# Patient Record
Sex: MALE | Race: WHITE | NOT HISPANIC OR LATINO | Employment: OTHER | URBAN - METROPOLITAN AREA
[De-identification: names, ages, dates, MRNs, and addresses within clinical notes are randomized per-mention and may not be internally consistent; named-entity substitution may affect disease eponyms.]

---

## 2018-01-11 ENCOUNTER — INITIAL CONSULT (OUTPATIENT)
Dept: DERMATOLOGY | Facility: CLINIC | Age: 68
End: 2018-01-11
Payer: MEDICARE

## 2018-01-11 VITALS
SYSTOLIC BLOOD PRESSURE: 147 MMHG | WEIGHT: 175 LBS | HEART RATE: 76 BPM | DIASTOLIC BLOOD PRESSURE: 82 MMHG | HEIGHT: 67 IN | BODY MASS INDEX: 27.47 KG/M2

## 2018-01-11 DIAGNOSIS — C44.329 SQUAMOUS CELL CARCINOMA OF SKIN OF RIGHT TEMPLE: Primary | ICD-10-CM

## 2018-01-11 PROCEDURE — 99999 PR PBB SHADOW E&M-NEW PATIENT-LVL III: CPT | Mod: PBBFAC,,, | Performed by: DERMATOLOGY

## 2018-01-11 PROCEDURE — 99203 OFFICE O/P NEW LOW 30 MIN: CPT | Mod: S$GLB,,, | Performed by: DERMATOLOGY

## 2018-01-11 RX ORDER — ASPIRIN 81 MG/1
81 TABLET ORAL DAILY
COMMUNITY

## 2018-01-11 RX ORDER — BENAZEPRIL HYDROCHLORIDE 40 MG/1
40 TABLET ORAL DAILY
Refills: 3 | COMMUNITY
Start: 2017-11-05

## 2018-01-11 RX ORDER — ALPRAZOLAM 1 MG/1
1 TABLET ORAL NIGHTLY PRN
Refills: 3 | COMMUNITY
Start: 2018-01-02

## 2018-01-11 RX ORDER — NIFEDIPINE 60 MG/1
30 TABLET, EXTENDED RELEASE ORAL DAILY
COMMUNITY

## 2018-01-11 RX ORDER — LANOLIN ALCOHOL/MO/W.PET/CERES
400 CREAM (GRAM) TOPICAL DAILY
COMMUNITY

## 2018-01-11 RX ORDER — OMEPRAZOLE 20 MG/1
20 CAPSULE, DELAYED RELEASE ORAL DAILY
COMMUNITY

## 2018-01-11 RX ORDER — ATORVASTATIN CALCIUM 20 MG/1
20 TABLET, FILM COATED ORAL DAILY
Refills: 1 | COMMUNITY
Start: 2017-11-05 | End: 2019-02-06 | Stop reason: DRUGHIGH

## 2018-01-11 RX ORDER — METFORMIN HYDROCHLORIDE 1000 MG/1
1000 TABLET ORAL 2 TIMES DAILY WITH MEALS
COMMUNITY

## 2018-01-11 RX ORDER — FENOFIBRATE 145 MG/1
145 TABLET, FILM COATED ORAL DAILY
COMMUNITY

## 2018-01-11 NOTE — PROGRESS NOTES
REFERRING MD:  Florence Shine M.D.    CHIEF COMPLAINT:  New patient being consulted for Mohs' surgery evaluation.    HISTORY OF PRESENT ILLNESS:  67 y.o. male presents with a 3-4 month(s) history of growth on the R temple.    Negative for scabbing.  Negative for crusting.  Negative for bleeding.  Negative for itching.    Biopsy consistent with squamous cell carcinoma.     No prior treatment.    Pacemaker: No  Defibrillator: No  Artificial joints: No  Artificial heart valves: No    PAST MEDICAL HISTORY:  Past Medical History:   Diagnosis Date    Kidney failure     Stage 3       PAST SURGICAL HISTORY:  Past Surgical History:   Procedure Laterality Date    bilateral cataract surgery          SOCIAL HISTORY:  Dependencies:  former smoker    PERTINENT MEDICATIONS:  See medications list.  aspirin    ALLERGIES:  Patient has no known allergies.    ROS:  Skin: See HPI  Constitutional: No fatigue, fever, malaise, weight loss, or night sweats.  Cardiovascular: No chest pain, palpitations, or edema.  Respiratory: No coughing, wheezing, SOB, or sputum production.    Physical Exam   HENT:   Head:             General: Mood and affect normal. Alert and orient X3. Normal appearance.  Head/Face: R upper lateral forehead with a 9 x 9 mm pearly pink plaque located 6 cm anteriorly from the right superior ear attachment and 3 cm superiorly.   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    PLAN:  The diagnosis and the pathology report were discussed in detail with the patient. Treatment options were reviewed, including Mohs Micrographic Surgery, radiation, topical therapy, and standard excision.  After careful review of patient's history and physical exam, and after discussion of treatment options,  the decision was made to perform Mohs micrographic surgery.    Scheduled patient for Mohs Micrographic Surgery. Risks, benefits, and alternatives of Mohs' surgery discussed with the patient. Discussed repair options including complex closure, skin flap, skin graft and second intention healing with the patient. Pre-operative instructions provided to the patient. Okay to stay on aspirin.    Spent 30 minutes in coordination of care and/or consultation with patient discussing diagnosis, treatment options, risks and benefits of each. All questions answered.

## 2018-01-24 ENCOUNTER — PROCEDURE VISIT (OUTPATIENT)
Dept: DERMATOLOGY | Facility: CLINIC | Age: 68
End: 2018-01-24
Payer: MEDICARE

## 2018-01-24 VITALS
HEIGHT: 67 IN | BODY MASS INDEX: 27.47 KG/M2 | HEART RATE: 62 BPM | DIASTOLIC BLOOD PRESSURE: 59 MMHG | SYSTOLIC BLOOD PRESSURE: 116 MMHG | WEIGHT: 175 LBS

## 2018-01-24 DIAGNOSIS — C44.329 SQUAMOUS CELL CARCINOMA OF SKIN OF RIGHT TEMPLE: Primary | ICD-10-CM

## 2018-01-24 PROCEDURE — 17312 MOHS ADDL STAGE: CPT | Mod: S$GLB,,, | Performed by: DERMATOLOGY

## 2018-01-24 PROCEDURE — 99499 UNLISTED E&M SERVICE: CPT | Mod: S$GLB,,, | Performed by: DERMATOLOGY

## 2018-01-24 PROCEDURE — 17311 MOHS 1 STAGE H/N/HF/G: CPT | Mod: S$GLB,,, | Performed by: DERMATOLOGY

## 2018-01-24 PROCEDURE — 13131 CMPLX RPR F/C/C/M/N/AX/G/H/F: CPT | Mod: 51,S$GLB,, | Performed by: DERMATOLOGY

## 2018-01-24 RX ORDER — ERGOCALCIFEROL 1.25 MG/1
CAPSULE ORAL
Refills: 3 | COMMUNITY
Start: 2017-11-03 | End: 2018-05-07

## 2018-01-24 RX ORDER — CEPHALEXIN 500 MG/1
500 CAPSULE ORAL EVERY 8 HOURS
Qty: 21 CAPSULE | Refills: 0 | Status: SHIPPED | OUTPATIENT
Start: 2018-01-24 | End: 2018-01-31

## 2018-01-24 RX ORDER — OXYCODONE AND ACETAMINOPHEN 5; 325 MG/1; MG/1
1 TABLET ORAL EVERY 4 HOURS PRN
Qty: 20 TABLET | Refills: 0 | Status: SHIPPED | OUTPATIENT
Start: 2018-01-24 | End: 2018-05-07

## 2018-01-24 NOTE — PROGRESS NOTES
PROCEDURE: Mohs' Micrographic Surgery    INDICATION: Location in mask areas of face including central face, nose, eyelids, eyebrows, lips, chin, preauricular, temple, and ear.    REFERRING MD: Florence Shine M.D.    CASE NUMBER:     ANESTHETIC: 3 cc 0.5% Lidocaine with Epi 1:200,000 mixed 1:1 with 0.5% Bupivacaine    SURGICAL PREP: Hibiclens    SURGEON: Maggie Harris MD    ASSISTANTS: Marleni Myers MA    PREOPERATIVE DIAGNOSIS: squamous cell carcinoma    POSTOPERATIVE DIAGNOSIS: squamous cell carcinoma    PATHOLOGIC DIAGNOSIS: squamous cell carcinoma    HISTOLOGY OF SPECIMENS IN FIRST STAGE:   Tumor Type: Tumor seen. Moderately-differentiated squamous cell carcinoma: Proliferation of squamous cells exhibiting atypia of moderate differentiation and infiltrating within the dermis.   Depth of Invasion: epidermis and dermis  Perineural Invasion: No      HISTOLOGY OF SPECIMENS IN SUBSEQUENT STAGES:  · Tumor Type: No tumor seen.    STAGES OF MOHS' SURGERY PERFORMED: 2    TUMOR-FREE PLANE ACHIEVED: Yes    HEMOSTASIS: electrocoagulation     SPECIMENS: 3 (2 in stage A and 1 in stage B)    LOCATION: right temple    INITIAL LESION SIZE: 0.4 x 0.4 cm    FINAL DEFECT SIZE: 0.8 x 0.8 cm    WOUND REPAIR/DISPOSITION: Palpation of right preauricular, postauricular, anterior cervical, posterior cervical, sublingual and submental lymph nodes did not find these to be enlarged. The patient tolerated Mohs' Micrographic Surgery for a squamous cell carcinoma very well. When the tumor was completely removed, a repair of the surgical defect was undertaken.         PROCEDURE: Complex Linear Repair    INDICATION: Status post Mohs' Micrographic Surgery for squamous cell carcinoma.    REFERRING MD: Florence Shine M.D.    CASE NUMBER:     SURGEON: Maggie Harris MD    ASSISTANTS: Marleni Myers MA    ANESTHETIC: 2 cc 1% Lidocaine with Epinephrine 1:100,000    SURGICAL PREP: Hibiclens    LOCATION: right temple    DEFECT SIZE: 0.8  "x 0.8 cm    WOUND REPAIR/DISPOSITION:  After the patient's carcinoma had been completely removed with Mohs' Micrographic Surgery, a repair of the surgical defect was undertaken. The patient was returned to the operating suite where the area of the right temple was prepped, draped, and anesthetized in the usual sterile fashion. The wound was widely undermined in all directions. Then, electrocoagulation was used to obtain meticulous hemostasis. 4-0 Vicryl buried vertical mattress sutures were placed into the subcutaneous and dermal plane to close the wound and agustín the cutaneous wound edge. Bilateral dog ears were identified and were removed by a standard Burow's triangle technique. The cutaneous wound edges were closed using interrupted 5-0 Prolene suture.    The patient tolerated the procedure well.    The area was cleaned and dressed appropriately and the patient was given wound care instructions, as well as appointment for follow-up evaluation. Pt was placed on Keflex 500 mg tid x 1 week and Percocet 5 prn postop pain.    LENGTH OF REPAIR: 2 cm    Vitals:    01/24/18 0909 01/24/18 1139   BP: 129/66 (!) 116/59   BP Location: Left arm    Patient Position: Sitting    BP Method: Large (Automatic)    Pulse: 66 62   Weight: 79.4 kg (175 lb)    Height: 5' 7" (1.702 m)      "

## 2018-01-31 ENCOUNTER — OFFICE VISIT (OUTPATIENT)
Dept: DERMATOLOGY | Facility: CLINIC | Age: 68
End: 2018-01-31
Payer: MEDICARE

## 2018-01-31 DIAGNOSIS — Z09 POSTOP CHECK: Primary | ICD-10-CM

## 2018-01-31 PROCEDURE — 99024 POSTOP FOLLOW-UP VISIT: CPT | Mod: S$GLB,,, | Performed by: DERMATOLOGY

## 2018-01-31 PROCEDURE — 99999 PR PBB SHADOW E&M-EST. PATIENT-LVL III: CPT | Mod: PBBFAC,,, | Performed by: DERMATOLOGY

## 2018-01-31 NOTE — PROGRESS NOTES
67 y.o. male patient is here for suture removal following Mohs' surgery.    Patient reports no problems with right temple.    WOUND PE:  The right temple sutures intact. Wound healing well. Good skin edges. No signs or symptoms of infection.      IMPRESSION:  Healing operative site from Mohs' surgery SCC, right temple s/p Mohs with CLC, postop day # 7.    PLAN:  Sutures removed today. Steri-strips applied.  Continue wound care.  Keep moist with Aquaphor.    RTC:  In 3-6 months with Florence Shine M.D. for skin check or sooner if new concern arises.

## 2018-04-11 ENCOUNTER — TELEPHONE (OUTPATIENT)
Dept: DERMATOLOGY | Facility: CLINIC | Age: 68
End: 2018-04-11

## 2018-04-11 NOTE — TELEPHONE ENCOUNTER
Established pt is a referral from Dr. Shnie with SCC on L forehead. Pt is scheduled for mohs surgery on 5/7 at 800 am. Pt confirmed date and time. Appointment letter and pre-op instructions sent in the mail.

## 2018-05-07 ENCOUNTER — PROCEDURE VISIT (OUTPATIENT)
Dept: DERMATOLOGY | Facility: CLINIC | Age: 68
End: 2018-05-07
Payer: MEDICARE

## 2018-05-07 VITALS
DIASTOLIC BLOOD PRESSURE: 64 MMHG | HEIGHT: 67 IN | BODY MASS INDEX: 27.47 KG/M2 | SYSTOLIC BLOOD PRESSURE: 117 MMHG | WEIGHT: 175 LBS | HEART RATE: 69 BPM

## 2018-05-07 DIAGNOSIS — C44.329 SQUAMOUS CELL CARCINOMA OF FOREHEAD: Primary | ICD-10-CM

## 2018-05-07 PROCEDURE — 99499 UNLISTED E&M SERVICE: CPT | Mod: S$GLB,,, | Performed by: DERMATOLOGY

## 2018-05-07 PROCEDURE — 13131 CMPLX RPR F/C/C/M/N/AX/G/H/F: CPT | Mod: 51,S$GLB,, | Performed by: DERMATOLOGY

## 2018-05-07 PROCEDURE — 17311 MOHS 1 STAGE H/N/HF/G: CPT | Mod: S$GLB,,, | Performed by: DERMATOLOGY

## 2018-05-07 RX ORDER — OXYCODONE AND ACETAMINOPHEN 5; 325 MG/1; MG/1
1 TABLET ORAL
Qty: 20 TABLET | Refills: 0 | Status: SHIPPED | OUTPATIENT
Start: 2018-05-07 | End: 2018-12-12

## 2018-05-07 NOTE — PROGRESS NOTES
PROCEDURE: Mohs' Micrographic Surgery    INDICATION: Location in non-mask areas of face where maximum conservation of tumor-free tissue is needed. Biopsy-proven skin cancer of cosmetically and functionally important areas, including head, neck, genital, hand, foot, or areas known for having difficulty in healing, such as the lower anterior legs. Tumor with ill-defined borders.    REFERRING MD: Florence Shine M.D.    CASE NUMBER:     ANESTHETIC: 2 cc 0.5% Lidocaine with Epi 1:200,000 mixed 1:1 with 0.5% Bupivacaine    SURGICAL PREP: Hibiclens    SURGEON: Maggie Harris MD    ASSISTANTS: Marlen Alves PA-C and Alexia Martínez, Surg Tech    PREOPERATIVE DIAGNOSIS: squamous cell carcinoma    POSTOPERATIVE DIAGNOSIS: squamous cell carcinoma    PATHOLOGIC DIAGNOSIS: squamous cell carcinoma- superficially invasive, well to moderately differentiated    HISTOLOGY OF SPECIMENS IN FIRST STAGE:   Tumor Type: No tumor seen.    STAGES OF MOHS' SURGERY PERFORMED: 1    TUMOR-FREE PLANE ACHIEVED: Yes    HEMOSTASIS: electrocoagulation     SPECIMENS: 2     LOCATION: left (mid lateral) forehead. Patient verified location.    INITIAL LESION SIZE: 0.4 x 0.6 cm    FINAL DEFECT SIZE: 0.6 x 0.8 cm    WOUND REPAIR/DISPOSITION: The patient tolerated Mohs' Micrographic Surgery for a squamous cell carcinoma very well. When the tumor was completely removed, a repair of the surgical defect was undertaken.      PROCEDURE: Complex Linear Repair    INDICATION: Status post Mohs' Micrographic Surgery for squamous cell carcinoma.    CASE NUMBER:     SURGEON: Maggie Harris MD    ASSISTANTS: Marlen Alves PA-C and Alexia Martínez, Surg Tech    ANESTHETIC: 2.5 cc 1% Lidocaine with Epinephrine 1:100,000    SURGICAL PREP: Hibiclens    LOCATION: left (mid lateral) forehead    DEFECT SIZE: 0.6 x 0.8 cm    WOUND REPAIR/DISPOSITION:  After the patient's carcinoma had been completely removed with Mohs' Micrographic Surgery, a repair of the surgical defect  "was undertaken. The patient was returned to the operating suite where the area of left mid lateral forehead was prepped, draped, and anesthetized in the usual sterile fashion. The wound was widely undermined in all directions. Then, electrocoagulation was used to obtain meticulous hemostasis. 5-0 Vicryl buried vertical mattress sutures were placed into the subcutaneous and dermal plane to close the wound and agustín the cutaneous wound edge. Bilateral dog ears were identified and were removed by a standard Burow's triangle technique. The cutaneous wound edges were closed using interrupted 5-0 Prolene suture.    The patient tolerated the procedure well.    The area was cleaned and dressed appropriately and the patient was given wound care instructions, as well as appointment for follow-up evaluation. Patient was placed on Percocet 5 prn postop pain.    LENGTH OF REPAIR: 2 cm    Vitals:    05/07/18 0754 05/07/18 1000   BP: 107/62 117/64   BP Location: Right arm Left arm   Patient Position: Sitting Sitting   BP Method: Small (Automatic) Small (Automatic)   Pulse: 78 69   Weight: 79.4 kg (175 lb)    Height: 5' 7" (1.702 m)          "

## 2018-05-14 ENCOUNTER — OFFICE VISIT (OUTPATIENT)
Dept: DERMATOLOGY | Facility: CLINIC | Age: 68
End: 2018-05-14
Payer: MEDICARE

## 2018-05-14 DIAGNOSIS — Z09 POSTOP CHECK: Primary | ICD-10-CM

## 2018-05-14 PROCEDURE — 99024 POSTOP FOLLOW-UP VISIT: CPT | Mod: S$GLB,,, | Performed by: DERMATOLOGY

## 2018-05-14 PROCEDURE — 99999 PR PBB SHADOW E&M-EST. PATIENT-LVL II: CPT | Mod: PBBFAC,,, | Performed by: DERMATOLOGY

## 2018-05-14 NOTE — PROGRESS NOTES
67 y.o. male patient is here for suture removal following Mohs' surgery.    Patient reports no problems.    WOUND PE:  The left forehead sutures intact. Wound healing well. Good skin edges. No signs or symptoms of infection.        IMPRESSION:  Healing operative site from Mohs' surgery SCC, left forehead s/p Mohs' with CLC, postop day # 7.    PLAN:  Sutures removed today. Steri-strips applied.  Continue wound care.  Keep moist with Aquaphor.  Call if any concern arises.    RTC:  In 3-6 months with Florence Shine M.D. for skin check or sooner if new concern arises.

## 2018-12-03 ENCOUNTER — TELEPHONE (OUTPATIENT)
Dept: DERMATOLOGY | Facility: CLINIC | Age: 68
End: 2018-12-03

## 2018-12-03 NOTE — TELEPHONE ENCOUNTER
----- Message from Alexia Martínez sent at 12/3/2018  5:02 PM CST -----  Contact: pt at [phone]      ----- Message -----  From: Corrie Sommer MA  Sent: 12/3/2018   4:45 PM  To: Alexia Martínez        ----- Message -----  From: Lauren Burt  Sent: 12/3/2018   4:21 PM  To: Kimberly [facility] Staff    Kimberly -pt is being referred by dr. Florence Shine.  Case was faxed to us last Friday.  Pt is ready to set up appt.

## 2018-12-03 NOTE — TELEPHONE ENCOUNTER
Pt is a referral from Dr. Shine with BCC on R radial forearm, will schedule mohs once photo is received.

## 2018-12-12 ENCOUNTER — PROCEDURE VISIT (OUTPATIENT)
Dept: DERMATOLOGY | Facility: CLINIC | Age: 68
End: 2018-12-12
Payer: MEDICARE

## 2018-12-12 VITALS
WEIGHT: 175 LBS | BODY MASS INDEX: 27.47 KG/M2 | DIASTOLIC BLOOD PRESSURE: 82 MMHG | HEART RATE: 82 BPM | HEIGHT: 67 IN | SYSTOLIC BLOOD PRESSURE: 159 MMHG

## 2018-12-12 DIAGNOSIS — C44.612 BASAL CELL CARCINOMA, ARM, RIGHT: Primary | ICD-10-CM

## 2018-12-12 PROCEDURE — 13121 CMPLX RPR S/A/L 2.6-7.5 CM: CPT | Mod: 51,S$GLB,, | Performed by: DERMATOLOGY

## 2018-12-12 PROCEDURE — 17313 MOHS 1 STAGE T/A/L: CPT | Mod: S$GLB,,, | Performed by: DERMATOLOGY

## 2018-12-12 PROCEDURE — 99499 UNLISTED E&M SERVICE: CPT | Mod: S$GLB,,, | Performed by: DERMATOLOGY

## 2018-12-12 RX ORDER — OXYCODONE AND ACETAMINOPHEN 5; 325 MG/1; MG/1
1 TABLET ORAL
Qty: 20 TABLET | Refills: 0 | Status: SHIPPED | OUTPATIENT
Start: 2018-12-12 | End: 2018-12-27

## 2018-12-12 NOTE — PROGRESS NOTES
PROCEDURE: Mohs' Micrographic Surgery    INDICATION: Tumors with aggressive clinical behavior (rapidly growing, greater than 1 cm in diameter). Tumor with ill-defined borders.    REFERRING MD: Florence Shine M.D.    CASE NUMBER:     ANESTHETIC: 6 cc 0.5% Lidocaine with Epi 1:200,000 mixed 1:1 with 0.5% Bupivacaine    SURGICAL PREP: Hibiclens    SURGEON: Maggie Harris MD    ASSISTANTS: Sotero Garcia Surg Enrike    PREOPERATIVE DIAGNOSIS: basal cell carcinoma    POSTOPERATIVE DIAGNOSIS: basal cell carcinoma    PATHOLOGIC DIAGNOSIS: basal cell carcinoma- superfical, nodular    HISTOLOGY OF SPECIMENS IN FIRST STAGE:   Tumor Type: No tumor seen.    STAGES OF MOHS' SURGERY PERFORMED: 1    TUMOR-FREE PLANE ACHIEVED: Yes    HEMOSTASIS: electrocoagulation and 4-0 vicryl suture ties     SPECIMENS: 2     LOCATION: right radial forearm. Patient verified location.    INITIAL LESION SIZE: 1.2 x 1.5 cm    FINAL DEFECT SIZE: 1.4 x 1.8 cm    WOUND REPAIR/DISPOSITION: The patient tolerated Mohs' Micrographic Surgery for a basal cell carcinoma very well. When the tumor was completely removed, a repair of the surgical defect was undertaken.      PROCEDURE: Complex Linear Repair    INDICATION: Status post Mohs' Micrographic Surgery for basal cell carcinoma.    CASE NUMBER:     SURGEON: Maggie Harris MD    ASSISTANTS: Marlen Alves PA-C and Sotero Garcia Surg Enrike    ANESTHETIC: 3 cc 1% Lidocaine with Epinephrine 1:100,000    SURGICAL PREP: Hibiclens    LOCATION: right radial forearm    DEFECT SIZE: 1.4 x 1.8 cm    WOUND REPAIR/DISPOSITION:  After the patient's carcinoma had been completely removed with Mohs' Micrographic Surgery, a repair of the surgical defect was undertaken. The patient was returned to the operating suite where the area of right radial forearm was prepped, draped, and anesthetized in the usual sterile fashion. The wound was widely undermined in all directions. Then, electrocoagulation and 4-0 vicryl  "suture ties were used to obtain meticulous hemostasis. 4-0 Vicryl buried vertical mattress sutures were placed into the subcutaneous and dermal plane to close the wound and agustín the cutaneous wound edge. Bilateral dog ears were identified and were removed by a standard Burow's triangle technique. The cutaneous wound edges were closed using interrupted 4-0 Prolene suture.    The patient tolerated the procedure well.    The area was cleaned and dressed appropriately and the patient was given wound care instructions, as well as appointment for follow-up evaluation. Patient was placed on Percocet 5 prn postop pain.    LENGTH OF REPAIR: 4.3 cm    Vitals:    12/12/18 1255 12/12/18 1438   BP: 138/66 (!) 159/82   BP Location: Left arm Right arm   Patient Position:  Sitting   BP Method:  Small (Automatic)   Pulse: 80 82   Weight: 79.4 kg (175 lb)    Height: 5' 7" (1.702 m)          "

## 2018-12-20 ENCOUNTER — TELEPHONE (OUTPATIENT)
Dept: DERMATOLOGY | Facility: CLINIC | Age: 68
End: 2018-12-20

## 2018-12-20 RX ORDER — DOXYCYCLINE HYCLATE 100 MG
100 TABLET ORAL EVERY 12 HOURS
Qty: 20 TABLET | Refills: 0 | Status: SHIPPED | OUTPATIENT
Start: 2018-12-20 | End: 2018-12-30

## 2018-12-20 NOTE — TELEPHONE ENCOUNTER
----- Message from Asya Kelley sent at 12/20/2018 10:37 AM CST -----  Contact: pt   Kimberly - pt Same Day Appointment Request    Was an appointment with another provider offered?   N/A   Reason for FST appt.: pt had surgery on Wed 12/12/2018 pt is concerned about the healing on the site pt said it looks red and swollen pt said there is no discharge pt would like for someone to look at the surgery site. Pt said he has changed the dressing every day pt said its not improving   Communication Preference: can you please call pt at [phone]  Additional Information: none     LUI SENRIQUE

## 2018-12-20 NOTE — TELEPHONE ENCOUNTER
Pt was informed that Halima was called in to his pharmacy for inflammation of the incision site. Pt was given instruction on how to take the medication. Pt verbally stated that he understands the information given to him.

## 2018-12-20 NOTE — TELEPHONE ENCOUNTER
Pt had sx for Mohs BCC right radial forearm. Pt stated that the incision site is red and swollen. Pt stated that the site does not have any discharge but wanted Dr. Harris to take a look at the site. Pt stated that he has been using coban dressing around his arm and he thinks that he may have put it on to tight. Pt will send a photo of the site and was told that once received and Dr. Harris look at photo he will get a call back. Pt verbally stated that he understands the information given to him.

## 2018-12-27 ENCOUNTER — CLINICAL SUPPORT (OUTPATIENT)
Dept: DERMATOLOGY | Facility: CLINIC | Age: 68
End: 2018-12-27
Payer: MEDICARE

## 2018-12-27 PROCEDURE — 99999 PR PBB SHADOW E&M-EST. PATIENT-LVL II: CPT | Mod: PBBFAC,,,

## 2018-12-27 NOTE — PROGRESS NOTES
68 y.o. male patient is here for suture removal following Mohs' surgery.    Patient reports no problems with R radial forearm.     WOUND PE:  The R radial forearm sutures intact. Wound healing well. Good skin edges. No signs or symptoms of infection.      IMPRESSION:  Healing operative site from Mohs' surgery, BCC R radial forearm s/p MOHs' with CLC Postop day # 14    PLAN:  Sutures removed today. Steri-strips applied.  Continue wound care.  Keep moist with Aquaphor.    RTC:  In 3-6 months with Florence Shine MD  for skin check or sooner if new concern arises.

## 2019-02-04 ENCOUNTER — TELEPHONE (OUTPATIENT)
Dept: DERMATOLOGY | Facility: CLINIC | Age: 69
End: 2019-02-04

## 2019-02-04 NOTE — TELEPHONE ENCOUNTER
Pt had mohs on 12/12/18 for BCC on R radial forearm with CLC. Pt c/o area slow to heal and is concerned with the way it looks presently. Pt to email photo of site.     Dr. Harris reviewed photo and wants patient to come into clinic which is scheduled for 2/6 at 210 pm. Pt confirmed date and time.

## 2019-02-04 NOTE — TELEPHONE ENCOUNTER
----- Message from Tamica Welsh sent at 2/4/2019 10:39 AM CST -----  Contact: patient  Please call above patient at [phone] need to speak with the doctor waiting on a call back thanks

## 2019-02-06 ENCOUNTER — OFFICE VISIT (OUTPATIENT)
Dept: DERMATOLOGY | Facility: CLINIC | Age: 69
End: 2019-02-06
Payer: MEDICARE

## 2019-02-06 DIAGNOSIS — T81.89XA SUTURE GRANULOMA, INITIAL ENCOUNTER: Primary | ICD-10-CM

## 2019-02-06 PROCEDURE — 99999 PR PBB SHADOW E&M-EST. PATIENT-LVL III: CPT | Mod: PBBFAC,,, | Performed by: DERMATOLOGY

## 2019-02-06 PROCEDURE — 11900 INJECT SKIN LESIONS </W 7: CPT | Mod: S$GLB,,, | Performed by: DERMATOLOGY

## 2019-02-06 PROCEDURE — 99999 PR PBB SHADOW E&M-EST. PATIENT-LVL III: ICD-10-PCS | Mod: PBBFAC,,, | Performed by: DERMATOLOGY

## 2019-02-06 PROCEDURE — 99499 NO LOS: ICD-10-PCS | Mod: S$GLB,,, | Performed by: DERMATOLOGY

## 2019-02-06 PROCEDURE — 99499 UNLISTED E&M SERVICE: CPT | Mod: S$GLB,,, | Performed by: DERMATOLOGY

## 2019-02-06 PROCEDURE — 11900 PR INJECTION INTO SKIN LESIONS, UP TO 7: ICD-10-PCS | Mod: S$GLB,,, | Performed by: DERMATOLOGY

## 2019-02-06 RX ORDER — ERGOCALCIFEROL 1.25 MG/1
CAPSULE ORAL
Refills: 3 | COMMUNITY
Start: 2018-12-14

## 2019-02-06 RX ORDER — ATORVASTATIN CALCIUM 40 MG/1
TABLET, FILM COATED ORAL
COMMUNITY
Start: 2019-01-27

## 2019-02-06 RX ORDER — CARVEDILOL 6.25 MG/1
TABLET ORAL
Refills: 1 | COMMUNITY
Start: 2019-01-22

## 2019-02-06 NOTE — PROGRESS NOTES
68 y.o. male patient is here for wound check after surgery.    Patient reports swelling and redness on R radial forearm.  States it has been lumpy ever since procedure.  I was out of town for his suture removal appt, a photo was sent that I looked at and we started him on doxycycline 100 bid.  According to the patient, Dr. Torres looked at him at his suture removal appt and told him it was not infected and that he did not need to take the doxycycline so he never finished course. Still has them at [facility].     WOUND PE:  The R radial forearm incision is red with 1 cm subcutaneous nodule proximally and smaller one distally. No purulence or drainage.      IMPRESSION:  Healing operative site from Mohs' surgery, BCC R radial forearm s/p Mohs with CLC, postop week #7, now with most likely suture granuloma and brisk inflammatory response.    PLAN:  Etoh prep, 1% lido with epi 2cc,   Attempt to manually express granuloma with 11 blade - unable to get anything out.  Intralesional Kenalog 10 mg/cc, 1cc injected into nodules of scarline.  Rec warm wet compresses tid.  Has doxycycline from 2 months ago - advised to take and complete. Disc photosensitivity, GI.  Pressure bandage applied.  Recheck in 1 month, if no improvement consider kenalog 40.    Intralesional Kenalog 10mg/cc (1 cc total) injected into scarline today after obtaining verbal consent including risk of surrounding hypopigmentation. Patient tolerated procedure well.    Units: 1  NDC for Kenalog 10mg/cc:  4359-1131-63          RTC:  In 1 month.

## 2019-03-12 ENCOUNTER — OFFICE VISIT (OUTPATIENT)
Dept: DERMATOLOGY | Facility: CLINIC | Age: 69
End: 2019-03-12
Payer: MEDICARE

## 2019-03-12 DIAGNOSIS — Z09 POSTOP CHECK: Primary | ICD-10-CM

## 2019-03-12 PROCEDURE — 99024 POSTOP FOLLOW-UP VISIT: CPT | Mod: S$GLB,,, | Performed by: DERMATOLOGY

## 2019-03-12 PROCEDURE — 99999 PR PBB SHADOW E&M-EST. PATIENT-LVL III: CPT | Mod: PBBFAC,,, | Performed by: DERMATOLOGY

## 2019-03-12 PROCEDURE — 99024 PR POST-OP FOLLOW-UP VISIT: ICD-10-PCS | Mod: S$GLB,,, | Performed by: DERMATOLOGY

## 2019-03-12 PROCEDURE — 99999 PR PBB SHADOW E&M-EST. PATIENT-LVL III: ICD-10-PCS | Mod: PBBFAC,,, | Performed by: DERMATOLOGY

## 2019-03-12 RX ORDER — CARVEDILOL 12.5 MG/1
TABLET ORAL
COMMUNITY
Start: 2019-02-26

## 2019-03-12 RX ORDER — LORAZEPAM 0.5 MG/1
TABLET ORAL
Refills: 0 | COMMUNITY
Start: 2019-02-23

## 2019-03-12 NOTE — PROGRESS NOTES
68 y.o. male patient is here for wound check after surgery.    Patient reports no problems. States his arm looks much better than last time when it was so inflamed.    WOUND PE:  The R radial forearm incision is well healed. Mild firmness and erythema of scar. No nodularity or evidence of suture granuloma, (+) spitting suture proximally.      IMPRESSION:  Healing operative site from Mohs' surgery, BCC R radial forearm s/p Mohs with CLC, postop week #12, s/p brisk inflammatory response and suture granulomatous reaction now resolved after IL kenalog one month ago.    PLAN:  spitting suture removed.  Recommended massage tid.  Reassured patient that redness and firmness will fade with time.  Daily SPF.  Regular skin checks.    RTC:  In 3-6 months with Florence Shine M.D. for skin check or sooner if new concern arises.

## 2020-02-18 ENCOUNTER — OFFICE VISIT (OUTPATIENT)
Dept: URGENT CARE | Facility: CLINIC | Age: 70
End: 2020-02-18
Payer: MEDICARE

## 2020-02-18 VITALS
HEART RATE: 85 BPM | TEMPERATURE: 98 F | SYSTOLIC BLOOD PRESSURE: 116 MMHG | WEIGHT: 175 LBS | HEIGHT: 66 IN | OXYGEN SATURATION: 98 % | BODY MASS INDEX: 28.12 KG/M2 | DIASTOLIC BLOOD PRESSURE: 60 MMHG | RESPIRATION RATE: 15 BRPM
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
## 2020-02-18 DIAGNOSIS — L08.9: Primary | ICD-10-CM

## 2020-02-18 DIAGNOSIS — Z23 NEED FOR TD VACCINE: ICD-10-CM

## 2020-02-18 DIAGNOSIS — S61.459A: Primary | ICD-10-CM

## 2020-02-18 PROCEDURE — 90471 IMMUNIZATION ADMIN: CPT | Mod: S$GLB,,, | Performed by: FAMILY MEDICINE

## 2020-02-18 PROCEDURE — 90714 TD VACC NO PRESV 7 YRS+ IM: CPT | Mod: S$GLB,,, | Performed by: FAMILY MEDICINE

## 2020-02-18 PROCEDURE — 99203 OFFICE O/P NEW LOW 30 MIN: CPT | Mod: 25,S$GLB,, | Performed by: NURSE PRACTITIONER

## 2020-02-18 PROCEDURE — 90471 TD VACCINE GREATER THAN OR EQUAL TO 7YO WITH PRESERVATIVE IM: ICD-10-PCS | Mod: S$GLB,,, | Performed by: FAMILY MEDICINE

## 2020-02-18 PROCEDURE — 99203 PR OFFICE/OUTPT VISIT, NEW, LEVL III, 30-44 MIN: ICD-10-PCS | Mod: 25,S$GLB,, | Performed by: NURSE PRACTITIONER

## 2020-02-18 PROCEDURE — 90714 TD VACCINE GREATER THAN OR EQUAL TO 7YO WITH PRESERVATIVE IM: ICD-10-PCS | Mod: S$GLB,,, | Performed by: FAMILY MEDICINE

## 2020-02-18 RX ORDER — AMOXICILLIN AND CLAVULANATE POTASSIUM 875; 125 MG/1; MG/1
1 TABLET, FILM COATED ORAL 2 TIMES DAILY
Qty: 14 TABLET | Refills: 0 | Status: SHIPPED | OUTPATIENT
Start: 2020-02-18 | End: 2020-02-25

## 2020-02-18 RX ORDER — MUPIROCIN 20 MG/G
OINTMENT TOPICAL
Qty: 22 G | Refills: 0 | Status: SHIPPED | OUTPATIENT
Start: 2020-02-18

## 2020-02-18 NOTE — PROGRESS NOTES
"Subjective:       Patient ID: Pasha Huggins is a 69 y.o. male.    Vitals:  height is 5' 6" (1.676 m) and weight is 79.4 kg (175 lb). His temperature is 98.3 °F (36.8 °C). His blood pressure is 116/60 and his pulse is 85. His respiration is 15 and oxygen saturation is 98%.     Chief Complaint: Animal Bite (left)    Patient states his cat bit him on his left hand. Since then the swelling has gotten worse and he has not had a TD in a while.  Left hand is painful. He is left handed.     Animal Bite    Pertinent negatives include no cough.       Constitution: Negative for chills and fever.   HENT: Negative for facial swelling and sore throat.    Neck: Negative for painful lymph nodes.   Eyes: Negative for eye itching and eyelid swelling.   Respiratory: Negative for cough.    Musculoskeletal: Negative for joint pain and joint swelling.   Skin: Positive for color change and rash. Negative for pale, wound, abrasion, laceration, lesion, skin thickening/induration, puncture wound, erythema, abscess, avulsion and hives.   Allergic/Immunologic: Negative for environmental allergies, immunocompromised state and hives.   Hematologic/Lymphatic: Negative for swollen lymph nodes.       Objective:      Physical Exam   Constitutional: He is oriented to person, place, and time. He appears well-developed and well-nourished.  Non-toxic appearance. He does not have a sickly appearance. He does not appear ill.   HENT:   Head: Normocephalic and atraumatic. Head is without abrasion, without contusion and without laceration.   Right Ear: External ear normal.   Left Ear: External ear normal.   Nose: Nose normal.   Mouth/Throat: Oropharynx is clear and moist and mucous membranes are normal.   Eyes: Pupils are equal, round, and reactive to light. Conjunctivae, EOM and lids are normal.   Neck: Trachea normal, full passive range of motion without pain and phonation normal. Neck supple.   Cardiovascular: Normal rate, regular rhythm and normal heart " sounds.   Pulses:       Radial pulses are 2+ on the right side, and 2+ on the left side.   Pulmonary/Chest: Effort normal. No respiratory distress.   Musculoskeletal:        Left hand: He exhibits decreased range of motion, tenderness, laceration (3mm laceration with scab) and swelling (with erythema). Normal sensation noted. Normal strength noted.        Hands:  Neurological: He is alert and oriented to person, place, and time. No sensory deficit (+sensation/+movement of all fingers of left hand). Gait normal.   Skin: Skin is warm, dry, intact and no rash. Capillary refill takes 2 to 3 seconds. Lacerations - upper ext.:  left hand (3mm laceration with scab)abrasion, burn, bruising, erythema and ecchymosis  Psychiatric: He has a normal mood and affect. His speech is normal and behavior is normal. Judgment and thought content normal. Cognition and memory are normal.   Nursing note and vitals reviewed.        Assessment:       1. Infected cat bite of hand, initial encounter    2. Need for Td vaccine        Plan:         Infected cat bite of hand, initial encounter  -     Ambulatory referral/consult to Hand Surgery  -     mupirocin (BACTROBAN) 2 % ointment; Apply to affected area 2 times daily  Dispense: 22 g; Refill: 0  -     amoxicillin-clavulanate 875-125mg (AUGMENTIN) 875-125 mg per tablet; Take 1 tablet by mouth 2 (two) times daily. for 7 days  Dispense: 14 tablet; Refill: 0    Need for Td vaccine  -     (In Office Administered) Td Vaccine         ER precautions given to patient regarding increased pain and swelling to left hand in 24-48 hours despite antibiotics and warm compresses.

## 2020-02-18 NOTE — PATIENT INSTRUCTIONS
Return to Urgent Care or go to ER if symptoms worsen or fail to improve.  Follow up with PCP as recommended for further management.     You have been referred to Hand Surgery for further management.  Scheduling should contact you  to make an appointment.  If you do not hear from anyone, please call [phone] to schedule an  appointment with Hand Surgery.      Cat Bite    A cat bite can cause a wound deep enough to break the skin. In such cases, the wound is cleaned and then sometimes closed. If the wound is closed it is usually not closed completely. This is so that fluid can drain if the wound becomes infected. Often the wound is left open to heal. In addition to wound care, a tetanus shot may be given, if needed.  Home care  · Wash your hands well with soap and warm water before and after caring for the wound. This helps lower the risk of infection.  · Care for the wound as directed. If a dressing was applied to the wound, be sure to change it as directed.  · If the wound bleeds, place a clean, soft cloth on the wound. Then firmly apply pressure until the bleeding stops. This may take up to 5 minutes. Do not release the pressure and look at the wound during this time.  · Always get medical attention for cat bites on the hand. They are highly likely to become infected.  · Most wounds heal within 10 days. But an infection can occur even with proper treatment. So be sure to check the wound daily for signs of infection (see below).  · Antibiotics may be prescribed. These help prevent or treat infection. If youre given antibiotics, take them as directed. Also be sure to complete the medicines.  Rabies prevention  Rabies is a virus that can be carried in certain animals. These can include domestic animals such as cats and dogs. Pets fully vaccinated against rabies (2 shots) are at very low risk of infection. But because human rabies is almost always fatal, any biting pet should be confined for 10 days as an extra  precaution. In general, if there is a risk for rabies, the following steps may need to be taken:  · If someones pet cat has bitten you, it should be kept in a secure area for the next 10 days to watch for signs of illness. (If the pet owner wont allow this, contact your local animal control center.) If the cat becomes ill or dies during that time, contact your local animal control center at once so the animal may be tested for rabies. If the cat stays healthy for the next 10 days, there is no danger of rabies in the animal or you.  · If a stray cat bit you, contact your local animal control center. They can give information on capture, quarantine, and animal rabies testing.  · If you cant find the animal that bit you in the next 2 days, and if rabies exists in your area, you may need to receive the rabies vaccine series. Call your healthcare provider right away. Or return to the emergency department promptly.  · All animal bites should be reported to the local animal control center. If you were not given a form to fill out, you can report this yourself.  Follow-up care  Follow up with your healthcare provider, or as directed.  When to seek medical advice  Call your healthcare provider right away if any of these occur:  · Signs of infection:  ¨ Spreading redness or warmth from the wound  ¨ Increased pain or swelling  ¨ Fever of 100.4ºF (38ºC) or higher, or as directed by your healthcare provider  ¨ Colored fluid or pus draining from the wound  ¨ Enlarged lymph nodes above the area that was bitten, such as lymph nodes in the armpit if you were bitten on the hand or arm. This may be a sign of cat-scratch disease (cat-scratch fever).  · Signs of rabies infection:  ¨ Headache  ¨ Confusion  ¨ Strange behavior  ¨ Increased salivating or drooling  ¨ Seizure  · Decreased ability to move any body part near the bite area  · Bleeding that can't be stopped after 5 minutes of firm pressure  Date Last Reviewed: 3/23/2015  ©  2125-1627 [employer]. [address] [place] [address], [place] [address]. All rights reserved. This information is not intended as a substitute for professional medical care. Always follow your healthcare professional's instructions.

## 2020-02-21 ENCOUNTER — TELEPHONE (OUTPATIENT)
Dept: URGENT CARE | Facility: CLINIC | Age: 70
End: 2020-02-21

## 2021-04-26 ENCOUNTER — PATIENT MESSAGE (OUTPATIENT)
Dept: RESEARCH | Facility: HOSPITAL | Age: 71
End: 2021-04-26